# Patient Record
Sex: FEMALE | Race: BLACK OR AFRICAN AMERICAN | NOT HISPANIC OR LATINO | URBAN - METROPOLITAN AREA
[De-identification: names, ages, dates, MRNs, and addresses within clinical notes are randomized per-mention and may not be internally consistent; named-entity substitution may affect disease eponyms.]

---

## 2017-08-14 ENCOUNTER — EMERGENCY (EMERGENCY)
Facility: HOSPITAL | Age: 52
LOS: 1 days | Discharge: HOME | End: 2017-08-14
Attending: EMERGENCY MEDICINE | Admitting: EMERGENCY MEDICINE
Payer: MEDICAID

## 2017-08-14 VITALS
DIASTOLIC BLOOD PRESSURE: 79 MMHG | HEART RATE: 97 BPM | SYSTOLIC BLOOD PRESSURE: 124 MMHG | OXYGEN SATURATION: 100 % | RESPIRATION RATE: 15 BRPM | TEMPERATURE: 98 F

## 2017-08-14 PROCEDURE — 99283 EMERGENCY DEPT VISIT LOW MDM: CPT | Mod: 25

## 2017-08-14 PROCEDURE — 73562 X-RAY EXAM OF KNEE 3: CPT

## 2017-08-14 RX ORDER — IBUPROFEN 200 MG
600 TABLET ORAL ONCE
Qty: 0 | Refills: 0 | Status: COMPLETED | OUTPATIENT
Start: 2017-08-14 | End: 2017-08-14

## 2017-08-14 RX ADMIN — Medication 600 MILLIGRAM(S): at 23:34

## 2017-08-14 NOTE — ED PROVIDER NOTE - OBJECTIVE STATEMENT
52 y.o. female coming in with right knee pain unsure of how she came to injury it.  Endorses some right lower back pain as well but the knee is her primary reason for coming.  Has not taken anything for the pain, no redness or swelling to the knee.  Pain worse with ambulation.  No fevers no chills.  No other compltaints at this time.

## 2017-08-14 NOTE — ED PROVIDER NOTE - MUSCULOSKELETAL, MLM
Right hip NT full ROM.  Right knee tender over patella and head of tibia.  Full ROM, neg ant/post/mary/jace

## 2017-08-14 NOTE — ED ADULT NURSE NOTE - OBJECTIVE STATEMENT
52y female arrived to ED complaining of right knee pain. Patient is not sure how the pain began, she thinks it may be an old injury from a fall. No swelling, redness or bruising at the knee. Pain worsens with movement.

## 2017-08-14 NOTE — ED PROVIDER NOTE - ATTENDING CONTRIBUTION TO CARE
52F with R knee pain. no direct trauma.   on exam, full ROM, no effusion. minimal ttp patellar, fibular head.   xray ro fx, pain meds. likely OA. fu ortho/pmd.

## 2017-08-15 PROCEDURE — 73562 X-RAY EXAM OF KNEE 3: CPT | Mod: 26,RT
